# Patient Record
Sex: MALE | Race: WHITE | NOT HISPANIC OR LATINO | URBAN - METROPOLITAN AREA
[De-identification: names, ages, dates, MRNs, and addresses within clinical notes are randomized per-mention and may not be internally consistent; named-entity substitution may affect disease eponyms.]

---

## 2020-01-08 ENCOUNTER — IMPORTED ENCOUNTER (OUTPATIENT)
Dept: URBAN - METROPOLITAN AREA CLINIC 31 | Facility: CLINIC | Age: 25
End: 2020-01-08

## 2020-01-08 PROBLEM — H04.123: Noted: 2020-01-08

## 2020-01-08 PROCEDURE — 92004 COMPRE OPH EXAM NEW PT 1/>: CPT

## 2020-01-08 PROCEDURE — 92310 CONTACT LENS FITTING OU: CPT

## 2020-01-08 PROCEDURE — 92015 DETERMINE REFRACTIVE STATE: CPT

## 2020-01-08 NOTE — PATIENT DISCUSSION
1.  Refractive error - Glasses change optional. Continue with Dailies Total 1 but try updated schulzt. If likes can order. If not can stay with old rx. Use rewetting gtt PRN. 2. Dry Eyes OU:  Start artificials tears. Encouraged regular use. 3.  Return for an appointment in 1 year for comprehensive exam. with Dr. Diaz Carson.

## 2022-04-01 ASSESSMENT — VISUAL ACUITY
OD_SC: 20/20
OD_CC: CF@6'
OS_CC: CF@6'
OS_SC: 20/20

## 2022-04-01 ASSESSMENT — TONOMETRY
OS_IOP_MMHG: 13
OD_IOP_MMHG: 13

## 2022-10-19 NOTE — PATIENT DISCUSSION
Pt not ready for Cataract surgery in right eye at this time, does not feel it is significantly impacting daily activities.  Pt elects not to proceed with cataract surgery at this time.

## 2022-10-19 NOTE — PATIENT DISCUSSION
10/19/22 JOD:  Possible traumatic cataract, Pt recalls 1 month ago, blunt trauma to OS (glob of paint) Cataract may be related to this or diabetes, END OF DAY CASE.  Goal Adv [employer] OS ONLY at this time.  OD cataract not significant enough for surgery at this time.  Pt to return in 2 weeks for repeat measurements after dry eye regimen.

## 2022-10-19 NOTE — PROCEDURE NOTE: CLINICAL
PROCEDURE NOTE: Punctal Plugs, Collagen 0.5mm x2 #2 OU. Diagnosis: Dry Eye Syndrome. Prior to treatment, the risks/benefits/alternatives were discussed. The patient wished to proceed with procedure. 1 drop of Proparacaine 0.5% was instilled in eye. Puncta was dilated with punctal dilator. Collagen plugs were placed into the selected punctum with forceps. Size/location of plugs inserted: 0.5mm RLL, LLL. The patient tolerated the procedure well. There were no complications. Post procedure instructions given. Gage Welsh

## 2022-10-19 NOTE — PATIENT DISCUSSION
10/19/22 JOD: Punctal plugs placed at slit lamp today RLL, LLL.  Recommended artificial tears to use: 1 drop 4x a day in both eyes, and start using Flarex bid OU until gone.  Repeat measurements in 2 weeks.  Consider Cequa/Restasis regimen in the future.

## 2022-12-09 NOTE — PATIENT DISCUSSION
Discussed the importance of blood sugar control in the prevention of ocular complications. [Time Spent: ___ minutes] : I have spent [unfilled] minutes of face to face time with the patient